# Patient Record
Sex: FEMALE | Race: WHITE | NOT HISPANIC OR LATINO | URBAN - METROPOLITAN AREA
[De-identification: names, ages, dates, MRNs, and addresses within clinical notes are randomized per-mention and may not be internally consistent; named-entity substitution may affect disease eponyms.]

---

## 2017-09-10 ENCOUNTER — EMERGENCY (EMERGENCY)
Facility: HOSPITAL | Age: 37
LOS: 1 days | Discharge: HOME | End: 2017-09-10
Attending: EMERGENCY MEDICINE | Admitting: EMERGENCY MEDICINE
Payer: SELF-PAY

## 2017-09-10 VITALS
TEMPERATURE: 97 F | RESPIRATION RATE: 14 BRPM | DIASTOLIC BLOOD PRESSURE: 74 MMHG | HEIGHT: 68 IN | WEIGHT: 179.9 LBS | OXYGEN SATURATION: 100 % | SYSTOLIC BLOOD PRESSURE: 112 MMHG | HEART RATE: 76 BPM

## 2017-09-10 DIAGNOSIS — Z98.1 ARTHRODESIS STATUS: Chronic | ICD-10-CM

## 2017-09-10 DIAGNOSIS — Z98.891 HISTORY OF UTERINE SCAR FROM PREVIOUS SURGERY: Chronic | ICD-10-CM

## 2017-09-10 PROCEDURE — 99284 EMERGENCY DEPT VISIT MOD MDM: CPT

## 2017-09-10 PROCEDURE — 99282 EMERGENCY DEPT VISIT SF MDM: CPT

## 2017-09-10 NOTE — ED PROVIDER NOTE - PROGRESS NOTE DETAILS
patient is refusing xray, refusing pregnancy test, advised patient follow up with her ortho on Friday. patient is refusing xray, refusing pregnancy test, requesting rx for percocet, offered motrin rx and prednisone, explained would need a pregnancy test, patient refused, stated will follow up with her ortho

## 2017-09-10 NOTE — ED ADULT NURSE NOTE - CHIEF COMPLAINT QUOTE
"This morning I lifted my son into the car seat and I hurt my back."  pt c/o lower back pain, has history of herniations

## 2017-09-10 NOTE — ED ADULT NURSE NOTE - OBJECTIVE STATEMENT
patient c/o lower back pain from lifting car seat today, able to ambulate without assistance. Asking for percocet, instructed to try heat, motrin, rest and follow up with her orthopedic doctor.

## 2017-09-10 NOTE — ED PROVIDER NOTE - OBJECTIVE STATEMENT
35 yo female presents with left lower back pain, states began this am after lifting her son into her car, denies blunt trauma, took motrin 800 mg this am and at 7 pm this evening.  states she has hx of herniated discs.  has appt on Friday, for epidural injection, by her ortho Dr Coleman.  no change in bowel and bladder habits.  patient is persistent in requesting rx for percocet.  refusing to have urine checked, refusing xray 37 yo female presents with left lower back pain, states began this am after lifting her son into her car, denies blunt trauma, took motrin 800 mg this am and at 7 pm this evening.  states she has hx of herniated discs.  has appt on Friday, for epidural injection, by her ortho Dr Coleman.  no change in bowel and bladder habits.  ambulated into ED.  patient is persistent in requesting rx for percocet.  refusing to have urine pregnancy test , refusing xray

## 2017-09-13 DIAGNOSIS — M54.5 LOW BACK PAIN: ICD-10-CM
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
## 2017-12-25 ENCOUNTER — EMERGENCY (EMERGENCY)
Facility: HOSPITAL | Age: 37
LOS: 1 days | Discharge: HOME | End: 2017-12-25
Attending: EMERGENCY MEDICINE | Admitting: EMERGENCY MEDICINE
Payer: SELF-PAY

## 2017-12-25 VITALS
HEART RATE: 85 BPM | TEMPERATURE: 98 F | DIASTOLIC BLOOD PRESSURE: 64 MMHG | SYSTOLIC BLOOD PRESSURE: 133 MMHG | WEIGHT: 184.97 LBS | HEIGHT: 68 IN | RESPIRATION RATE: 16 BRPM | OXYGEN SATURATION: 100 %

## 2017-12-25 DIAGNOSIS — Z98.1 ARTHRODESIS STATUS: Chronic | ICD-10-CM

## 2017-12-25 DIAGNOSIS — Z98.891 HISTORY OF UTERINE SCAR FROM PREVIOUS SURGERY: Chronic | ICD-10-CM

## 2017-12-25 PROCEDURE — 99284 EMERGENCY DEPT VISIT MOD MDM: CPT

## 2017-12-25 PROCEDURE — 99283 EMERGENCY DEPT VISIT LOW MDM: CPT

## 2017-12-25 NOTE — ED PROVIDER NOTE - MEDICAL DECISION MAKING DETAILS
pt with hx chronic low back pain c/o worsening of low back pain s/p bending over to picking up heavy box

## 2017-12-25 NOTE — ED PROVIDER NOTE - OBJECTIVE STATEMENT
pt with hx low back pain c/o worsening of chronic left low back pain radiating to hip c/w her chronic low back pain s/p lifting heavy box this am. no weakness, numbness, incontinence. pt declined xray, relates percocet has helped in past for similar pains  spine - dewal pt with hx low back pain c/o worsening of chronic left low back pain radiating to hip c/w her chronic low back pain s/p lifting heavy box this am. no weakness, numbness, incontinence. pt declined xray, relates percocet has helped in past for similar pains. pt took motrin pta, is driving, cannot take percocet in er.  spine - [facility]

## 2021-10-25 NOTE — ED ADULT TRIAGE NOTE - HEIGHT IN CM
172.72 Application Tool (Optional): Liquid Nitrogen Sprayer Show Aperture Variable?: Yes Aperture Size (Optional): B Post-Care Instructions: I reviewed with the patient in detail post-care instructions. Patient is to wear sunprotection, and avoid picking at any of the treated lesions. Pt may apply Vaseline to crusted or scabbing areas. Number Of Freeze-Thaw Cycles: 2 freeze-thaw cycles Render Note In Bullet Format When Appropriate: No Consent: The patient's consent was obtained including but not limited to risks of crusting, scabbing, blistering, scarring, darker or lighter pigmentary change, recurrence, incomplete removal and infection. Detail Level: Simple Duration Of Freeze Thaw-Cycle (Seconds): 3

## 2022-04-01 NOTE — ED ADULT NURSE NOTE - CAS TRG GENERAL NORM CIRC DET
Pause for the cause has been completed prior to Nd Yag on lower lip.   1. Janine was identified by both name and date of birth -  YES.   2. The correct site was identified -  YES.   3. Site marked by provider - YES.   4. Written informed consent correct and signed or verbal authorization  to proceed is obtained -  YES.   5. Verify necessary supplies, equipment, and diagnostics are available -  YES.   6. Time out is performed immediately prior to procedure -  YES.     Strong peripheral pulses

## 2022-07-25 NOTE — ED ADULT TRIAGE NOTE - ACCOMPANIED BY
Self Consent 2/Introductory Paragraph: Mohs surgery was explained to the patient and consent was obtained. The risks, benefits and alternatives to therapy were discussed in detail. Specifically, the risks of infection, scarring, bleeding, prolonged wound healing, incomplete removal, allergy to anesthesia, nerve injury and recurrence were addressed. Prior to the procedure, the treatment site was clearly identified and confirmed by the patient. All components of Universal Protocol/PAUSE Rule completed.

## 2023-01-16 NOTE — ED PROVIDER NOTE - GENITOURINARY NEGATIVE STATEMENT, MLM
[facility] is requesting 90 day prescriptions.    no dysuria, no frequency, and no hematuria.

## 2023-07-08 NOTE — ED PROVIDER NOTE - MUSCULOSKELETAL [+], MLM
From: Carol Grandchild Sweet  To: Dr. Rodrigues [place]: 7/7/2023 6:55 PM EDT  Subject: Tylenol 3 refill    Doctor Lis Chinchilla or Radha Elizabeth, I need a refill on my Tylenol 3. I have 4 left.    Thanks    Esther [facility]IN